# Patient Record
Sex: MALE | Race: WHITE
[De-identification: names, ages, dates, MRNs, and addresses within clinical notes are randomized per-mention and may not be internally consistent; named-entity substitution may affect disease eponyms.]

---

## 2018-06-09 ENCOUNTER — HOSPITAL ENCOUNTER (EMERGENCY)
Dept: HOSPITAL 25 - ED | Age: 68
Discharge: ANOTHER HOSPITAL | End: 2018-06-09
Payer: MEDICARE

## 2018-06-09 VITALS — SYSTOLIC BLOOD PRESSURE: 164 MMHG | DIASTOLIC BLOOD PRESSURE: 68 MMHG

## 2018-06-09 DIAGNOSIS — N20.0: ICD-10-CM

## 2018-06-09 DIAGNOSIS — G81.91: ICD-10-CM

## 2018-06-09 DIAGNOSIS — Z93.6: ICD-10-CM

## 2018-06-09 DIAGNOSIS — R41.0: ICD-10-CM

## 2018-06-09 DIAGNOSIS — R47.81: ICD-10-CM

## 2018-06-09 DIAGNOSIS — I65.23: ICD-10-CM

## 2018-06-09 DIAGNOSIS — R29.810: ICD-10-CM

## 2018-06-09 DIAGNOSIS — R31.9: ICD-10-CM

## 2018-06-09 DIAGNOSIS — I63.9: Primary | ICD-10-CM

## 2018-06-09 DIAGNOSIS — R29.705: ICD-10-CM

## 2018-06-09 LAB
BASOPHILS # BLD AUTO: 0.1 10^3/UL (ref 0–0.2)
EOSINOPHIL # BLD AUTO: 0.3 10^3/UL (ref 0–0.6)
HCT VFR BLD AUTO: 40 % (ref 42–52)
HGB BLD-MCNC: 12.7 G/DL (ref 14–18)
INR PPP/BLD: 0.93 (ref 0.77–1.02)
LYMPHOCYTES # BLD AUTO: 1.8 10^3/UL (ref 1–4.8)
MCH RBC QN AUTO: 26 PG (ref 27–31)
MCHC RBC AUTO-ENTMCNC: 31 G/DL (ref 31–36)
MCV RBC AUTO: 81 FL (ref 80–94)
MONOCYTES # BLD AUTO: 0.7 10^3/UL (ref 0–0.8)
NEUTROPHILS # BLD AUTO: 9.1 10^3/UL (ref 1.5–7.7)
NRBC # BLD AUTO: 0 10^3/UL
NRBC BLD QL AUTO: 0
PLATELET # BLD AUTO: 355 10^3/UL (ref 150–450)
RBC # BLD AUTO: 4.95 10^6/UL (ref 4–5.4)
WBC # BLD AUTO: 11.9 10^3/UL (ref 3.5–10.8)

## 2018-06-09 PROCEDURE — 96374 THER/PROPH/DIAG INJ IV PUSH: CPT

## 2018-06-09 PROCEDURE — 99284 EMERGENCY DEPT VISIT MOD MDM: CPT

## 2018-06-09 PROCEDURE — 86901 BLOOD TYPING SEROLOGIC RH(D): CPT

## 2018-06-09 PROCEDURE — 70498 CT ANGIOGRAPHY NECK: CPT

## 2018-06-09 PROCEDURE — 84484 ASSAY OF TROPONIN QUANT: CPT

## 2018-06-09 PROCEDURE — 85730 THROMBOPLASTIN TIME PARTIAL: CPT

## 2018-06-09 PROCEDURE — 86850 RBC ANTIBODY SCREEN: CPT

## 2018-06-09 PROCEDURE — 36415 COLL VENOUS BLD VENIPUNCTURE: CPT

## 2018-06-09 PROCEDURE — 93005 ELECTROCARDIOGRAM TRACING: CPT

## 2018-06-09 PROCEDURE — 85025 COMPLETE CBC W/AUTO DIFF WBC: CPT

## 2018-06-09 PROCEDURE — 85610 PROTHROMBIN TIME: CPT

## 2018-06-09 PROCEDURE — 86900 BLOOD TYPING SEROLOGIC ABO: CPT

## 2018-06-09 PROCEDURE — 70450 CT HEAD/BRAIN W/O DYE: CPT

## 2018-06-09 PROCEDURE — 70496 CT ANGIOGRAPHY HEAD: CPT

## 2018-06-09 PROCEDURE — 71045 X-RAY EXAM CHEST 1 VIEW: CPT

## 2018-06-09 PROCEDURE — 83605 ASSAY OF LACTIC ACID: CPT

## 2018-06-09 PROCEDURE — 80053 COMPREHEN METABOLIC PANEL: CPT

## 2018-06-09 PROCEDURE — 80061 LIPID PANEL: CPT

## 2018-06-09 NOTE — RAD
INDICATION:  Acute neurologic changes



COMPARISON: None.



TECHNIQUE: Contiguous axial sections of the brain were obtained from the skull base to the

vertex without contrast.



FINDINGS:



Image quality is limited by streak artifact from motion.



The ventricles, cisterns and sulci are within normal limits.  



At the right caudate head nucleus there is a 4 mm focus of hypoattenuation which could be

an age indeterminant lacunar infarction. Otherwise the gray-white matter differentiation

is adequately maintained and there is no sulcal effacement. No significant focal

abnormality or mass effect is present. 



There is no evidence for intracranial hemorrhage.



No significant focal osseous abnormality is present. 



There is opacification of the visualized portion of the left axillary sinus and moderate

mucosal thickening of the bilateral anterior ethmoid air cells.



There is trace mastoid air cell effusion at the dependent-most mastoid air cells.



IMPRESSION:  

1. Evaluation is limited by motion streak artifact. 2 series of images were acquired both

with varying degrees of streak artifact.

2. Age indeterminant 4 mm lacunar infarction at the right caudate head nucleus.

3. Otherwise no definite CT evidence of acute intracranial abnormality including acute

intracranial bleed.

4. Paranasal sinus mucosal disease as described above and trace left mastoid air cell

effusion.



Findings as well as image quality limitations were discussed with Dr. Noriega at 1038 hours

on June 09, 2018.

## 2018-06-09 NOTE — ED
Grace RUIZ Emily, scribed for Nydia Noriega MD on 06/09/18 at 1038 .





Neurological HPI





- HPI Summary


HPI Summary: 


This patient is a 67 year old M BIBA presenting to [facility] with a chief complaint 

of R-sided weakness, slurred speech, confusion, right facial droop that began 

at 0845. The patient reports his last known well time of 0845am.  Symptoms 

aggravated by nothing. Symptoms alleviated by nothing. Pt reports that he is 

not currently on blood thinners and states no other medications. Per EMS, the 

patient, feeling unwell, called his neighbors at 0845. His neighbors then 

called for EMS. Per EMS, the patient was sitting in a chair when the EMS 

arrived on scene. The patient reports that he fell today, and was unable to get 

back up. States he did not hit his head.  Gerber Harvey called with ETA 5 mins 

prior to presentation to ED. Pt examined at charge desk on EMS stretcher and 

taken directly to CT for CT/CTA.  Pt with chronic indwelling grajeda.  Right 

nephrostomy tube noted later in ED course.  Per wife nephrostomy placed for 

kidney stones at [facility] by Dr. Oswald Medina approximately 2 months ago.

  Pt's wife is in [place] for rehab for cancer at [facility].  





Blood pressure 178/87


HR 71 BPM


O2 sat 100


Respiratory rate 23








- History of Current Complaint


Stated Complaint: CODE GRAY


Hx Obtained From: Patient, Family/Caretaker - wife, EMS


Onset/Duration: Sudden Onset, Started hours ago, Still Present


Timing: Constant


Onset Severity: Severe


Current Severity: Moderate


Neurological Deficit Location: Facial - right facial droop, aphasia, RUE


Character: Motor Weakness - right arm, Impaired Speech, Confusion, Other: - 

aphasia, dysarthria


Aggravating: Nothing


Alleviating: Nothing


Associated Signs and Symptoms: Positive: Confusion, Impaired Speech


TPA Considered: Yes - per consult Dr. Don, TPA candidate, TPA given





- Allergy/Home Medications


Allergies/Adverse Reactions: 


 Allergies











Allergy/AdvReac Type Severity Reaction Status Date / Time


 


No Known Allergies Allergy   Verified 06/06/13 11:06











Home Medications: 


 Home Medications





NK [No Home Medications Reported]  06/09/18 [History Confirmed 06/09/18]











PMH/Surg Hx/FS Hx/Imm Hx


Previously Healthy: No - kidney stones with grajeda and nephrostomy tube 


Endocrine/Hematology History: 


   Denies: Hx Anticoagulant Therapy, Hx Diabetes, Hx Thyroid Disease


Cardiovascular History: 


   Denies: Hx Hypertension, Hx Pacemaker/ICD


Respiratory History: 


   Denies: Hx Asthma, Hx Chronic Obstructive Pulmonary Disease (COPD)


 History: Reports: Hx Kidney Stones


   Denies: Hx Renal Disease


Neurological History: 


   Denies: Hx Seizures


Psychiatric History: 


   Denies: Hx Substance Abuse





- Surgical History


Surgery Procedure, Year, and Place: Nephrostomy, right, placed approx 2 months 

ago at [facility] per wife


Hx Anesthesia Reactions: No


Infectious Disease History: No


Infectious Disease History: 


   Denies: Hx Hepatitis, Hx Human Immunodeficiency Virus (HIV)





- Family History


Known Family History: Positive: Hypertension





- Social History


Occupation: Retired


Lives: Alone - wife is in rehab for cancer in [facility]


Alcohol Use: None


Hx Substance Use: No


Substance Use Type: Reports: None


Hx Tobacco Use: Yes - Unknown


Smoking Status (MU): Never Smoked Tobacco





Review of Systems


Constitutional: Negative


Eyes: Negative


ENT: Negative


Cardiovascular: Negative


Respiratory: Negative


Gastrointestinal: Negative


Positive: other - grajeda and right nephrostomy 


Musculoskeletal: Negative


Skin: Negative


Neurological: Other - Positive confusion


Positive: Weakness - R-sided, arm and right facial droop , Slurred Speech


Psychological: Normal


All Other Systems Reviewed And Are Negative: Yes





Physical Exam





- Summary


Physical Exam Summary: 


Appearance: chronically ill appearing, no pain distress, obese, poor dentition 


Skin: Warm, color reflects adequate perfusion, dry


Head: R facial droop, atraumatic


Eyes: Conjunctiva clear, PERRL, EOMI, no nystagmus


ENT: mucous membranes moist, poor dentition, multiple missing teeth


Neck: Supple, no nodes, no JVD, no bruits


Respiratory: Lungs clear, normal breath sounds, no respiratory distress


Cardio: RRR, No murmur, pulses normal, brisk capillary refill


Abdomen: Soft, nontender, indwelling grajeda with cloudy foul smelling urine, 

right nephrostomy with cloudy urine


Bowel sounds: Present 


Musculoskeletal: no calf tenderness, no edema.


Psychological: Normal


Neuro: A&O x3, R facial droop, remaining cranial nerves intact, R arm weakness, 

sensation intact, ataxia R arm, GCS 14, NIH 5





Triage Information Reviewed: Yes


Vital Signs On Initial Exam: 


 Initial Vitals











Temp Pulse Resp BP Pulse Ox


 


 97.5 F   73   23   178/87   95 


 


 06/09/18 10:43  06/09/18 10:43  06/09/18 10:43  06/09/18 10:43  06/09/18 10:43











Vital Signs Reviewed: Yes





- [place] Coma Scale


Best Eye Response: 4 - Spontaneous


Best Motor Response: 6 - Obeys Commands


Best Verbal Response: 4 - Confused


Coma Scale Total: 14





Diagnostics





- Vital Signs


 Vital Signs











  Temp Pulse Resp BP Pulse Ox


 


 06/09/18 12:28   57  17  178/76  98


 


 06/09/18 12:18   59  18  169/77  100


 


 06/09/18 12:08   58  26  172/76  98


 


 06/09/18 12:01   59  21   100


 


 06/09/18 11:59   60  24  179/77  98


 


 06/09/18 11:32   72  16  157/75  96


 


 06/09/18 11:23      95


 


 06/09/18 11:22   65  23  162/83  96


 


 06/09/18 11:01   67  21  165/96  96


 


 06/09/18 11:00   74  22   95


 


 06/09/18 10:43  97.5 F  68  24  178/87  96














- Laboratory


Lab Results: 


 Lab Results











  06/09/18 06/09/18 06/09/18 Range/Units





  10:47 10:53 10:53 


 


WBC   11.9 H   (3.5-10.8)  10^3/ul


 


RBC   4.95   (4.0-5.4)  10^6/ul


 


Hgb   12.7 L   (14.0-18.0)  g/dl


 


Hct   40 L   (42-52)  %


 


MCV   81   (80-94)  fL


 


MCH   26 L   (27-31)  pg


 


MCHC   31   (31-36)  g/dl


 


RDW   16 H   (10.5-15)  %


 


Plt Count   355   (150-450)  10^3/ul


 


MPV   7.0 L   (7.4-10.4)  um3


 


Neut % (Auto)   75.8   (38-83)  %


 


Lymph % (Auto)   14.9 L   (25-47)  %


 


Mono % (Auto)   6.1   (0-7)  %


 


Eos % (Auto)   2.6   (0-6)  %


 


Baso % (Auto)   0.6   (0-2)  %


 


Absolute Neuts (auto)   9.1 H   (1.5-7.7)  10^3/ul


 


Absolute Lymphs (auto)   1.8   (1.0-4.8)  10^3/ul


 


Absolute Monos (auto)   0.7   (0-0.8)  10^3/ul


 


Absolute Eos (auto)   0.3   (0-0.6)  10^3/ul


 


Absolute Basos (auto)   0.1   (0-0.2)  10^3/ul


 


Absolute Nucleated RBC   0   10^3/ul


 


Nucleated RBC %   0   


 


INR (Anticoag Therapy)    0.93  (0.77-1.02)  


 


APTT    32.2  (26.0-36.3)  seconds


 


Sodium     (139-145)  mmol/L


 


Potassium     (3.5-5.0)  mmol/L


 


Chloride     (101-111)  mmol/L


 


Carbon Dioxide     (22-32)  mmol/L


 


Anion Gap     (2-11)  mmol/L


 


BUN     (6-24)  mg/dL


 


Creatinine     (0.67-1.17)  mg/dL


 


Est GFR ( Amer)     (>60)  


 


Est GFR (Non-Af Amer)     (>60)  


 


BUN/Creatinine Ratio     (8-20)  


 


Glucose     ()  mg/dL


 


Lactic Acid     (0.5-2.0)  mmol/L


 


Calcium     (8.6-10.3)  mg/dL


 


Total Bilirubin     (0.2-1.0)  mg/dL


 


AST     (13-39)  U/L


 


ALT     (7-52)  U/L


 


Alkaline Phosphatase     ()  U/L


 


Troponin I     (<0.04)  ng/mL


 


Total Protein     (6.4-8.9)  g/dL


 


Albumin     (3.2-5.2)  g/dL


 


Globulin     (2-4)  g/dL


 


Albumin/Globulin Ratio     (1-3)  


 


Triglycerides     mg/dL


 


Cholesterol     mg/dL


 


LDL Cholesterol     mg/dL


 


HDL Cholesterol     mg/dL


 


Blood Type  A Negative    


 


Antibody Screen  Negative    














  06/09/18 06/09/18 Range/Units





  10:53 10:53 


 


WBC    (3.5-10.8)  10^3/ul


 


RBC    (4.0-5.4)  10^6/ul


 


Hgb    (14.0-18.0)  g/dl


 


Hct    (42-52)  %


 


MCV    (80-94)  fL


 


MCH    (27-31)  pg


 


MCHC    (31-36)  g/dl


 


RDW    (10.5-15)  %


 


Plt Count    (150-450)  10^3/ul


 


MPV    (7.4-10.4)  um3


 


Neut % (Auto)    (38-83)  %


 


Lymph % (Auto)    (25-47)  %


 


Mono % (Auto)    (0-7)  %


 


Eos % (Auto)    (0-6)  %


 


Baso % (Auto)    (0-2)  %


 


Absolute Neuts (auto)    (1.5-7.7)  10^3/ul


 


Absolute Lymphs (auto)    (1.0-4.8)  10^3/ul


 


Absolute Monos (auto)    (0-0.8)  10^3/ul


 


Absolute Eos (auto)    (0-0.6)  10^3/ul


 


Absolute Basos (auto)    (0-0.2)  10^3/ul


 


Absolute Nucleated RBC    10^3/ul


 


Nucleated RBC %    


 


INR (Anticoag Therapy)    (0.77-1.02)  


 


APTT    (26.0-36.3)  seconds


 


Sodium  137 L   (139-145)  mmol/L


 


Potassium  4.0   (3.5-5.0)  mmol/L


 


Chloride  103   (101-111)  mmol/L


 


Carbon Dioxide  26   (22-32)  mmol/L


 


Anion Gap  8   (2-11)  mmol/L


 


BUN  19   (6-24)  mg/dL


 


Creatinine  0.74   (0.67-1.17)  mg/dL


 


Est GFR ( Amer)  134.9   (>60)  


 


Est GFR (Non-Af Amer)  104.9   (>60)  


 


BUN/Creatinine Ratio  25.7 H   (8-20)  


 


Glucose  96   ()  mg/dL


 


Lactic Acid   2.1 H*  (0.5-2.0)  mmol/L


 


Calcium  9.6   (8.6-10.3)  mg/dL


 


Total Bilirubin  0.40   (0.2-1.0)  mg/dL


 


AST  12 L   (13-39)  U/L


 


ALT  9   (7-52)  U/L


 


Alkaline Phosphatase  75   ()  U/L


 


Troponin I  0.00   (<0.04)  ng/mL


 


Total Protein  7.5   (6.4-8.9)  g/dL


 


Albumin  4.0   (3.2-5.2)  g/dL


 


Globulin  3.5   (2-4)  g/dL


 


Albumin/Globulin Ratio  1.1   (1-3)  


 


Triglycerides  184   mg/dL


 


Cholesterol  194   mg/dL


 


LDL Cholesterol  125   mg/dL


 


HDL Cholesterol  32.3   mg/dL


 


Blood Type    


 


Antibody Screen    











Result Diagrams: 


 06/09/18 10:53





 06/09/18 10:53


Lab Statement: Any lab studies that have been ordered have been reviewed, and 

results considered in the medical decision making process.





- Radiology


  ** CXR


Radiology Interpretation Completed By: Radiologist - CXR reveals, per 

radiologist, A small amount of blunting of the left costophrenic angle could be 

seen due to small pleural effusion or dependent left lower lobe consolidation. 

ED physician has reviewed this radiology report.





- CT


  ** Brain CT


CT Interpretation Completed By: Radiologist - Brain CT reveals, per radiologist

, 1. Evaluation is limited by motion streak artifact. 2 series of images were 

acquired both with varying degrees of streak artifact. 2. Age indeterminant 4 

mm lacunar infarction at the right caudate head nucleus. 3. Otherwise no 

definite CT evidence of acute intracranial abnormality including acute 

intracranial bleed. 4. Paranasal sinus mucosal disease as described above and 

trace left mastoid air cell effusion. ED physician has reviewed this radiology 

report.





  ** CTA Head and Neck


CT Interpretation Completed By: Radiologist - CTA head and neck reveals, per 

radiologist, 1. DUE TO Coarsely calcified atherosclerosis of the bilateral 

carotid bulbs there is high-grade stenosis measuring approximately 75% on the 

right and 66% on the left. The coarse calcification somewhat obscures the image 

quality and correlation can be made to carotid duplex sonography if it will 

influence clinical management. 2. There is no large vessel filling defect of 

the arteries of the head or neck. There is relative reduction of filling 

overlying the region supplied by the left pericallosal artery in addition to 

overall decreased arterial filling overlying the left frontal lobe relative to 

the right. If there is clinical evidence of acute stroke objective evidence can 

be obtained with MRI of the brain. ED physician has reviewed this radiology 

report.





- EKG


  ** 1113


Cardiac Rate: NL


EKG Rhythm: Sinus Rhythm - 64 BPM


EKG Interpretation: 1st degree AV block. Nml IVCT. Nml QTc. Nml axis. No acute 

changes





NIH Scale





- NIH Scale


Level of Consciousness: Alert/Keenly Responsive


Ask Patient the Month and His/Her Age: Both Correct


Ask Pt to Open/Close Eyes and /Release Non-Paretic Hand: Both Correctly


Best Gaze (Only Horizontal Eye Movement): Normal


Visual Field Testing: No Visual Loss


Facial Paresis-Pt to Smile & Close Eyes or Grimace Symmetry: Minor Paralysis


Motor Function - Right Arm: Drifts LT 10 seconds


Motor Function - Left Arm: No Drift-Holds 10 Seconds


Motor Function - Right Leg: No Drift-Holds 10 Seconds


Motor Function - Left Leg: No Drift-Holds 10 Seconds


Limb Ataxia-Must be out of Proportion to Weakness Present: Present in One Limb


Sensory (Use Pinprick to Test Arms/Legs/Trunk/Face): Normal


Best Language (Describe Picture, Name Items): Some Loss


Dysarthria (Read Several Words): Slurs Some Words


Extinction and Inattention: No Abnormality


Total Score: 5





Re-Evaluation





- Re-Evaluation


  ** First Eval


Re-Evaluation Time: 12:00


Change: Unchanged


Comment: speech improving, grajeda with hematuria





  ** Second Eval


Re-Evaluation Time: 12:35


Change: Unchanged


Comment: more blood from grajeda. nephrostomy with no blood, no definite 

drainage.  [facility]  

Advised wife of pt's stroke and transfer to [place].





Course/Dx





- Course


Course Of Treatment: This patient is a 67 year old M BIBA presenting to [facility] 

with a chief complaint of R-sided weakness that began at 0845. Pt has confusion 

and slurred speech. Pt reports that he fell today and was unable to get up. 

Consult with Dr. Don (neurology) at 1036. Discussed the plan of care for the 

patient. Consult with Dr. Frederick (radiology) at 1038. CT results were 

communicated. Consult with Dr. Don (neurology) at 1050. Tele-stroke consult 

initiated. Consult with Dr. Acosta (neurology) at 1209. He recommends the 

patient be transferred to a higher level of care facility due to critical 

carotid stenosis . Brain CT reveals, per radiologist, 1. Evaluation is limited 

by motion streak artifact. 2 series of images were acquired both with varying 

degrees of streak artifact. 2. Age indeterminant 4 mm lacunar infarction at the 

right caudate head nucleus. CTA head and neck reveals, per radiologist, 1. DUE 

TO Coarsely calcified atherosclerosis of the bilateral carotid bulbs there is 

high-grade stenosis measuring approximately 75% on the right and 66% on the 

left. The coarse calcification somewhat obscures the image quality and 

correlation can be made to carotid duplex sonography if it will influence 

clinical management. 2. There is no large vessel filling defect of the arteries 

of the head or neck. There is relative reduction of filling overlying the 

region supplied by the left pericallosal artery in addition to overall 

decreased arterial filling overlying the left frontal lobe relative to the 

right. The patient will be transferred for further evaluation. The patient is 

agreeable with this plan.





- Differential Dx


Differential Diagnoses Neuro: Positive: Cerebrovascular Accident, Hemorrhage, 

Intracranial Bleed, Other - sepsis





- Diagnoses


Provider Diagnoses: 


 Stroke, Received intravenous tissue plasminogen activator (tPA) in emergency 

department, Hematuria, gross, Carotid stenosis, bilateral





During the Visit The Following Alert/Code Occurred: Code Grey - Called at 1018, 

5 minutes PTA





- Physician Notifications


Discussed Care Of Patient With: Lizette Don


Time Discussed With Above Provider: 10:36


Instructed by Provider To: Transfer - Consult with Dr. Don (neurology) at 

1036. Discussed the plan of care for the patient. Consult with Dr. Frederick (

radiology) at 1038. CT results were communicated. Consult with Dr. Don (

neurology) at 1050. Tele-stroke consult initiated. Consult with Dr. Watkins (

intensivist) at 1130. He communicated that this case can be discussed with the 

hospitalist. Consult with Dr. Reece, hospitalist, who recommends consult with 

Dr. Pride. Consult with Dr. Acosta (neurology) at 1209. He recommends the 

patient be transferred to a higher level of care facility.





- Critical Care Time


Critical Care Time: 30-74 min - 60 minutes





Discharge





- Sign-Out/Discharge


Documenting (check all that apply): Discharge/Admit/Transfer - Transfer





- Discharge Plan


Condition: Stable


Disposition: TRANS HIGHER LVL OF CARE FAC


Referrals: 


Vane Lovell [Primary Care Provider] - 





- Billing Disposition and Condition


Condition: STABLE


Disposition: Trans Higher Lvl of Care Fac





The documentation as recorded by the Grace comer Emily accurately reflects the 

service I personally performed and the decisions made by me, Nydia Noriega MD.

## 2018-06-09 NOTE — RAD
CPT II: CPT II Codes: 3100F



INDICATION:  Acute onset confusion



COMPARISON:  Same day noncontrast CT of the brain limited by motion artifact.



TECHNIQUE:

A CT angiogram of the head and neck was performed with 80 cc of Visipaque 320.  Contiguous

axial sections were obtained from the thoracic inlet through the [place].  Images

were reconstructed in the sagittal, coronal planes and in a 3-D volume rendered format. 

The distal cervical internal carotid artery diameter is used as the denominater for

stenosis measurement.  



CTA NECK:



The common and internal carotid arteries are patent without hemodynamically significant

stenosis.  



Right:

Below the carotid bifurcation the common carotid artery measures 8 mm in short axis

diameter. At the carotid bulb there is coarse calcification narrowing the lumen to 2 mm in

diameter. This corresponds to at least 75% degree stenosis.



Left:

Below the carotid bifurcation the common carotid artery measures 6 mm in short axis

diameter. There is coarse atherosclerotic calcification at the carotid bulb narrowing the

lumen to less than 2 mm in diameter. This corresponds to at least 66% degree stenosis.



The vertebral arteries are patent without gross abnormality.



CTA of the brain:  



The internal carotid, anterior and middle cerebral arteries appear are patent without high

grade stenosis or occlusion.



There is a relative reduction in filling of the arteries along the distal lateral margin

of the left frontal lobe relative to the right including the pericallosal branch arteries.



The vertebral, basilar and posterior cerebral arteries appear patent without high grade

stenosis or occlusion.



The [place] is complete with bilateral posterior communicating arteries

identified.



No focal luminal filling defect, aneurysm or vascular malformation is seen.



NON-ARTERIAL FINDINGS:

There is complete opacification of the left maxillary sinus and left anterior ethmoid air

cells. There is moderate mucosal thickening of the right anterior ethmoid air cells. There

is partial opacification of the right sphenoid sinus.



IMPRESSION:  

1. DUE TO Coarsely calcified atherosclerosis of the bilateral carotid bulbs there is

high-grade stenosis measuring approximately 75% on the right and 66% on the left. The

coarse calcification somewhat obscures the image quality and correlation can be made to

carotid duplex sonography if it will influence clinical management.

2. There is no large vessel filling defect of the arteries of the head or neck. There is

relative reduction of filling overlying the region supplied by the left pericallosal

artery in addition to overall decreased arterial filling overlying the left frontal lobe

relative to the right. If there is clinical evidence of acute stroke objective evidence

can be obtained with MRI of the brain.

## 2018-06-09 NOTE — RAD
INDICATION: Acute neurologic changes



COMPARISON: None.

 

TECHNIQUE: Single AP portable view of the chest was obtained.



FINDINGS: 



Image quality is compromised due to the relative inferiority of a portable chest x-ray.



The heart and mediastinum exhibit normal size and contour.



The lungs are grossly clear. A small degree of blunting at the left costophrenic angle

could indicate small pleural effusion.



Visualized bones are normal for the patient's age.



IMPRESSION:  A small amount of blunting of the left costophrenic angle could be seen due

to small pleural effusion or dependent left lower lobe consolidation.